# Patient Record
Sex: FEMALE | Race: OTHER | HISPANIC OR LATINO | URBAN - METROPOLITAN AREA
[De-identification: names, ages, dates, MRNs, and addresses within clinical notes are randomized per-mention and may not be internally consistent; named-entity substitution may affect disease eponyms.]

---

## 2018-08-08 ENCOUNTER — EMERGENCY (EMERGENCY)
Facility: HOSPITAL | Age: 33
LOS: 1 days | Discharge: HOME | End: 2018-08-08
Attending: EMERGENCY MEDICINE
Payer: SELF-PAY

## 2018-08-08 VITALS
SYSTOLIC BLOOD PRESSURE: 109 MMHG | OXYGEN SATURATION: 99 % | RESPIRATION RATE: 18 BRPM | WEIGHT: 147.05 LBS | TEMPERATURE: 98 F | DIASTOLIC BLOOD PRESSURE: 72 MMHG | HEART RATE: 70 BPM | HEIGHT: 63 IN

## 2018-08-08 PROCEDURE — T1013: CPT

## 2018-08-08 PROCEDURE — 99283 EMERGENCY DEPT VISIT LOW MDM: CPT

## 2018-08-08 PROCEDURE — 99053 MED SERV 10PM-8AM 24 HR FAC: CPT

## 2018-08-08 PROCEDURE — 99283 EMERGENCY DEPT VISIT LOW MDM: CPT | Mod: 25

## 2018-08-08 RX ORDER — HYDROXYZINE HCL 10 MG
1 TABLET ORAL
Qty: 15 | Refills: 0 | OUTPATIENT
Start: 2018-08-08

## 2018-08-08 NOTE — ED STATDOCS - CARE PLAN
Principal Discharge DX:	Urticaria  Assessment and plan of treatment:	take prednisone as prescribed.  use hydroxyzine every 6 hours as needed for itch.  Follow-up with your PMD in 2-3 days for re-assessment and referral to allergist, if necessary.

## 2018-08-08 NOTE — ED STATDOCS - OBJECTIVE STATEMENT
itchy rash since last night: startred on arms and subsequwently spread to legs and torso.  no diff breathing, no diff swallowing.  H/O allergy to shrimp shell.  Denies new lotions/ creams/ perfumes, ointments, soaps, detergents.  Ate peanut butter yesterday but no prior peanut reaction. PMD: Giana

## 2018-08-08 NOTE — ED STATDOCS - PLAN OF CARE
take prednisone as prescribed.  use hydroxyzine every 6 hours as needed for itch.  Follow-up with your PMD in 2-3 days for re-assessment and referral to allergist, if necessary.

## 2018-08-22 ENCOUNTER — EMERGENCY (EMERGENCY)
Facility: HOSPITAL | Age: 33
LOS: 1 days | Discharge: HOME | End: 2018-08-22
Attending: EMERGENCY MEDICINE
Payer: SELF-PAY

## 2018-08-22 VITALS
RESPIRATION RATE: 16 BRPM | TEMPERATURE: 99 F | HEART RATE: 61 BPM | OXYGEN SATURATION: 100 % | DIASTOLIC BLOOD PRESSURE: 66 MMHG | SYSTOLIC BLOOD PRESSURE: 99 MMHG

## 2018-08-22 VITALS — HEIGHT: 63 IN | WEIGHT: 147.05 LBS

## 2018-08-22 PROCEDURE — 99283 EMERGENCY DEPT VISIT LOW MDM: CPT

## 2018-08-22 RX ORDER — CYCLOBENZAPRINE HYDROCHLORIDE 10 MG/1
1 TABLET, FILM COATED ORAL
Qty: 90 | Refills: 0 | OUTPATIENT
Start: 2018-08-22 | End: 2018-09-20

## 2018-08-22 RX ORDER — IBUPROFEN 200 MG
1 TABLET ORAL
Qty: 120 | Refills: 0 | OUTPATIENT
Start: 2018-08-22 | End: 2018-09-20

## 2018-08-22 NOTE — ED STATDOCS - OBJECTIVE STATEMENT
34 y/o F pt presents to the ED c/o headache for the past 2 weeks.  She notes the HA to be throbbing on the L-side, and note intermittent L arm tingling.  She has felt occasional nausea and intermittent L-sided neck pain.  When her HA is really bad, she notes some eye pain.  Currently in the ED her HA pain is rated 7/10.  Pt did not take Motrin today, and notes that when she does take Motrin, it does not help.  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  Denies congestions, facial pain, cough, vomiting.  No further acute complaints at this time.

## 2018-08-22 NOTE — ED STATDOCS - MEDICAL DECISION MAKING DETAILS
34 y/o F with possible tension headache. Will give muscle relaxers and have her follow up in [facility]. 32 y/o F with possible tension headache. Will give muscle relaxers and have her follow up in [facility].

## 2018-08-22 NOTE — ED STATDOCS - MUSCULOSKELETAL, MLM
range of motion is not limited and there is no muscle tenderness. tenderness to L paraspinal cervical muscles.

## 2020-02-25 ENCOUNTER — APPOINTMENT (OUTPATIENT)
Dept: DERMATOLOGY | Facility: CLINIC | Age: 35
End: 2020-02-25

## 2020-06-11 ENCOUNTER — APPOINTMENT (OUTPATIENT)
Dept: DERMATOLOGY | Facility: CLINIC | Age: 35
End: 2020-06-11

## 2020-07-09 ENCOUNTER — APPOINTMENT (OUTPATIENT)
Dept: DERMATOLOGY | Facility: CLINIC | Age: 35
End: 2020-07-09

## 2020-08-28 PROBLEM — Z00.00 ENCOUNTER FOR PREVENTIVE HEALTH EXAMINATION: Status: ACTIVE | Noted: 2020-08-28

## 2020-12-08 ENCOUNTER — APPOINTMENT (OUTPATIENT)
Dept: RHEUMATOLOGY | Facility: CLINIC | Age: 35
End: 2020-12-08
Payer: MEDICAID

## 2020-12-08 VITALS
TEMPERATURE: 98.4 F | SYSTOLIC BLOOD PRESSURE: 102 MMHG | OXYGEN SATURATION: 98 % | WEIGHT: 150 LBS | RESPIRATION RATE: 17 BRPM | HEIGHT: 63 IN | DIASTOLIC BLOOD PRESSURE: 64 MMHG | HEART RATE: 60 BPM | BODY MASS INDEX: 26.58 KG/M2

## 2020-12-08 DIAGNOSIS — Z78.9 OTHER SPECIFIED HEALTH STATUS: ICD-10-CM

## 2020-12-08 PROCEDURE — 99203 OFFICE O/P NEW LOW 30 MIN: CPT

## 2020-12-08 PROCEDURE — 99072 ADDL SUPL MATRL&STAF TM PHE: CPT

## 2020-12-08 RX ORDER — UBIDECARENONE/VIT E ACET 100MG-5
CAPSULE ORAL
Refills: 0 | Status: ACTIVE | COMMUNITY

## 2020-12-08 NOTE — PHYSICAL EXAM
[General Appearance - Alert] : alert [General Appearance - In No Acute Distress] : in no acute distress [General Appearance - Well Nourished] : well nourished [General Appearance - Well Developed] : well developed [Sclera] : the sclera and conjunctiva were normal [PERRL With Normal Accommodation] : pupils were equal in size, round, and reactive to light [Extraocular Movements] : extraocular movements were intact [Outer Ear] : the ears and nose were normal in appearance [Examination Of The Oral Cavity] : the lips and gums were normal [Oropharynx] : the oropharynx was normal [Neck Appearance] : the appearance of the neck was normal [Jugular Venous Distention Increased] : there was no jugular-venous distention [Auscultation Breath Sounds / Voice Sounds] : lungs were clear to auscultation bilaterally [Heart Rate And Rhythm] : heart rate was normal and rhythm regular [Heart Sounds] : normal S1 and S2 [Heart Sounds Gallop] : no gallops [Murmurs] : no murmurs [Heart Sounds Pericardial Friction Rub] : no pericardial rub [Bowel Sounds] : normal bowel sounds [Abdomen Soft] : soft [Abdomen Tenderness] : non-tender [No CVA Tenderness] : no ~M costovertebral angle tenderness [No Spinal Tenderness] : no spinal tenderness [Abnormal Walk] : normal gait [Nail Clubbing] : no clubbing  or cyanosis of the fingernails [Involuntary Movements] : no involuntary movements were seen [Musculoskeletal - Swelling] : no joint swelling seen [Motor Tone] : muscle strength and tone were normal [] : no rash [Skin Lesions] : no skin lesions [Sensation] : the sensory exam was normal to light touch and pinprick [Motor Exam] : the motor exam was normal [No Focal Deficits] : no focal deficits [Oriented To Time, Place, And Person] : oriented to person, place, and time [Impaired Insight] : insight and judgment were intact [Affect] : the affect was normal [Mood] : the mood was normal [FreeTextEntry1] : Cervical paraspinal tenderness

## 2020-12-08 NOTE — HISTORY OF PRESENT ILLNESS
[FreeTextEntry1] : 35 year old female with PMH as listed below presents today for an initial evaluation\par \par Reports to have 3 month hx of fatigue, myalgias to neck. Dx with Lyme disease 09/2020. Lyme IgM Interpretation +23, 39 band. s/p treatment with ABX for 2 weeks with some improvement in symptoms. \par \par Today reports to have continued, constant soreness/ myalgias to neck. Taking Motrin prn with some improvement in symptoms. Symptoms worse in AM. Denies radicular symptoms.

## 2020-12-08 NOTE — ASSESSMENT
[FreeTextEntry1] : 35 year old presents today for an initial evaluation. Has hx of fatigue, myalgias to neck. Dx with Lyme disease 09/2020. Lyme IgM Interpretation +23, 39 band. s/p treatment with ABX for 2 weeks with some improvement in symptoms. \par \par Myalgias to neck\par Fatigue\par \par -encouraged proper diet, good sleep hygiene, exercise \par -trail with cyclobenzaprine 5mg daily prn at bedtime \par -NSAIDS/Tylenol prn for pain (reviewed risk and benefits of medications)\par \par Discussed treatment plan with the patient. The patient was given the opportunity to ask questions and all questions were answered to their satisfaction.

## 2021-03-22 ENCOUNTER — APPOINTMENT (OUTPATIENT)
Dept: RHEUMATOLOGY | Facility: CLINIC | Age: 36
End: 2021-03-22
Payer: MEDICAID

## 2021-03-22 VITALS
RESPIRATION RATE: 17 BRPM | BODY MASS INDEX: 26.58 KG/M2 | HEART RATE: 83 BPM | SYSTOLIC BLOOD PRESSURE: 104 MMHG | TEMPERATURE: 97.6 F | HEIGHT: 63 IN | WEIGHT: 150 LBS | OXYGEN SATURATION: 98 % | DIASTOLIC BLOOD PRESSURE: 68 MMHG

## 2021-03-22 PROCEDURE — 99214 OFFICE O/P EST MOD 30 MIN: CPT

## 2021-03-22 PROCEDURE — 99072 ADDL SUPL MATRL&STAF TM PHE: CPT

## 2021-03-22 NOTE — HISTORY OF PRESENT ILLNESS
[FreeTextEntry1] : Pt presenting today for a f.u visit. Reports feeling well overall. Taking cyclobenzaprine 5mg daily prn at bedtime with improvement in symptoms. \par No current complaints. \par Requesting to repeat labs. \par Denies fever, chills, CP, SOB, abd pain, rashes.

## 2021-03-22 NOTE — ASSESSMENT
[FreeTextEntry1] : 35 year old presents today for an f/u visit. Has hx of fatigue, myalgias to neck. Dx with Lyme disease 09/2020. Lyme IgM Interpretation +23, 39 band. s/p treatment with ABX for 2 weeks with improvement in symptoms. \par \par Myalgias to neck\par Fatigue\par \par -pt requesting to repeat all labs\par -encouraged proper diet, good sleep hygiene, exercise \par -c/w cyclobenzaprine 5mg daily prn at bedtime \par -NSAIDS/Tylenol prn for pain\par \par Discussed treatment plan with the patient. The patient was given the opportunity to ask questions and all questions were answered to their satisfaction.

## 2021-03-28 LAB
25(OH)D3 SERPL-MCNC: 26.8 NG/ML
ALBUMIN SERPL ELPH-MCNC: 4.7 G/DL
ALP BLD-CCNC: 82 U/L
ALT SERPL-CCNC: 10 U/L
ANION GAP SERPL CALC-SCNC: 12 MMOL/L
APPEARANCE: CLEAR
AST SERPL-CCNC: 15 U/L
BASOPHILS # BLD AUTO: 0.04 K/UL
BASOPHILS NFR BLD AUTO: 0.8 %
BILIRUB SERPL-MCNC: 0.2 MG/DL
BILIRUBIN URINE: NEGATIVE
BLOOD URINE: ABNORMAL
BUN SERPL-MCNC: 14 MG/DL
CALCIUM SERPL-MCNC: 9.3 MG/DL
CHLORIDE SERPL-SCNC: 104 MMOL/L
CO2 SERPL-SCNC: 24 MMOL/L
COLOR: NORMAL
CREAT SERPL-MCNC: 0.62 MG/DL
CREAT SPEC-SCNC: 99 MG/DL
CREAT/PROT UR: 0.1 RATIO
CRP SERPL-MCNC: <3 MG/L
EOSINOPHIL # BLD AUTO: 0.02 K/UL
EOSINOPHIL NFR BLD AUTO: 0.4 %
ERYTHROCYTE [SEDIMENTATION RATE] IN BLOOD BY WESTERGREN METHOD: 23 MM/HR
GLUCOSE QUALITATIVE U: NEGATIVE
GLUCOSE SERPL-MCNC: 110 MG/DL
HCT VFR BLD CALC: 42.4 %
HGB BLD-MCNC: 13.2 G/DL
IMM GRANULOCYTES NFR BLD AUTO: 0.4 %
KETONES URINE: NEGATIVE
LEUKOCYTE ESTERASE URINE: NEGATIVE
LYMPHOCYTES # BLD AUTO: 1.18 K/UL
LYMPHOCYTES NFR BLD AUTO: 22.3 %
MAN DIFF?: NORMAL
MCHC RBC-ENTMCNC: 29.1 PG
MCHC RBC-ENTMCNC: 31.1 GM/DL
MCV RBC AUTO: 93.4 FL
MONOCYTES # BLD AUTO: 0.44 K/UL
MONOCYTES NFR BLD AUTO: 8.3 %
NEUTROPHILS # BLD AUTO: 3.58 K/UL
NEUTROPHILS NFR BLD AUTO: 67.8 %
NITRITE URINE: NEGATIVE
PH URINE: 6.5
PLATELET # BLD AUTO: 138 K/UL
POTASSIUM SERPL-SCNC: 3.5 MMOL/L
PROT SERPL-MCNC: 7.4 G/DL
PROT UR-MCNC: 6 MG/DL
PROTEIN URINE: NEGATIVE
RBC # BLD: 4.54 M/UL
RBC # FLD: 13.4 %
RHEUMATOID FACT SER QL: 13 IU/ML
SODIUM SERPL-SCNC: 141 MMOL/L
SPECIFIC GRAVITY URINE: 1.02
TSH SERPL-ACNC: 1.13 UIU/ML
UROBILINOGEN URINE: NORMAL
WBC # FLD AUTO: 5.28 K/UL

## 2021-04-14 LAB
A PHAGOCYTOPH IGG TITR SER IF: NORMAL TITER
ANA SER IF-ACNC: NEGATIVE
B BURGDOR AB SER QL IA: NEGATIVE
B BURGDOR AB SER-IMP: NEGATIVE
B BURGDOR IGM PATRN SER IB-IMP: NEGATIVE
B BURGDOR18KD IGG SER QL IB: NORMAL
B BURGDOR23KD IGG SER QL IB: NORMAL
B BURGDOR23KD IGM SER QL IB: NORMAL
B BURGDOR28KD IGG SER QL IB: NORMAL
B BURGDOR30KD IGG SER QL IB: NORMAL
B BURGDOR31KD IGG SER QL IB: NORMAL
B BURGDOR39KD IGG SER QL IB: NORMAL
B BURGDOR39KD IGM SER QL IB: NORMAL
B BURGDOR41KD IGG SER QL IB: PRESENT
B BURGDOR41KD IGM SER QL IB: PRESENT
B BURGDOR45KD IGG SER QL IB: NORMAL
B BURGDOR58KD IGG SER QL IB: NORMAL
B BURGDOR66KD IGG SER QL IB: NORMAL
B BURGDOR93KD IGG SER QL IB: NORMAL
B MICROTI IGG TITR SER: NORMAL TITER
C3 SERPL-MCNC: 157 MG/DL
C4 SERPL-MCNC: 27 MG/DL
CCP AB SER IA-ACNC: <8 UNITS
CENTROMERE IGG SER-ACNC: <0.2 CD:130001892
CHROMATIN AB SERPL-ACNC: <0.2 AL
DSDNA AB SER-ACNC: <12 IU/ML
E CHAFFEENSIS IGG TITR SER IF: NORMAL TITER
ENA RNP AB SER IA-ACNC: <0.2 AL
ENA RNP AB SER IA-ACNC: <0.2 AL
ENA SM AB SER IA-ACNC: <0.2 AL
ENA SS-A AB SER IA-ACNC: <0.2 AL
ENA SS-B AB SER IA-ACNC: <0.2 AL
RF+CCP IGG SER-IMP: NEGATIVE
THYROGLOB AB SERPL-ACNC: <20 IU/ML
THYROPEROXIDASE AB SERPL IA-ACNC: <10 IU/ML

## 2021-08-05 ENCOUNTER — APPOINTMENT (OUTPATIENT)
Dept: ORTHOPEDIC SURGERY | Facility: CLINIC | Age: 36
End: 2021-08-05
Payer: MEDICAID

## 2021-08-05 VITALS
BODY MASS INDEX: 27.64 KG/M2 | WEIGHT: 156 LBS | HEIGHT: 63 IN | DIASTOLIC BLOOD PRESSURE: 64 MMHG | HEART RATE: 66 BPM | SYSTOLIC BLOOD PRESSURE: 102 MMHG

## 2021-08-05 DIAGNOSIS — M25.561 PAIN IN RIGHT KNEE: ICD-10-CM

## 2021-08-05 DIAGNOSIS — Z78.9 OTHER SPECIFIED HEALTH STATUS: ICD-10-CM

## 2021-08-05 DIAGNOSIS — M25.562 PAIN IN LEFT KNEE: ICD-10-CM

## 2021-08-05 PROCEDURE — 73562 X-RAY EXAM OF KNEE 3: CPT | Mod: LT

## 2021-08-05 PROCEDURE — 99203 OFFICE O/P NEW LOW 30 MIN: CPT

## 2021-08-05 RX ORDER — CYCLOBENZAPRINE HYDROCHLORIDE 5 MG/1
5 TABLET, FILM COATED ORAL
Qty: 30 | Refills: 1 | Status: DISCONTINUED | COMMUNITY
Start: 2020-12-08 | End: 2021-08-05

## 2021-08-20 ENCOUNTER — APPOINTMENT (OUTPATIENT)
Dept: RHEUMATOLOGY | Facility: CLINIC | Age: 36
End: 2021-08-20
Payer: MEDICAID

## 2021-08-20 VITALS
BODY MASS INDEX: 27.64 KG/M2 | HEART RATE: 62 BPM | RESPIRATION RATE: 17 BRPM | HEIGHT: 63 IN | WEIGHT: 156 LBS | OXYGEN SATURATION: 99 % | DIASTOLIC BLOOD PRESSURE: 64 MMHG | SYSTOLIC BLOOD PRESSURE: 110 MMHG | TEMPERATURE: 98 F

## 2021-08-20 DIAGNOSIS — M79.10 MYALGIA, UNSPECIFIED SITE: ICD-10-CM

## 2021-08-20 DIAGNOSIS — R53.82 CHRONIC FATIGUE, UNSPECIFIED: ICD-10-CM

## 2021-08-20 PROCEDURE — 99213 OFFICE O/P EST LOW 20 MIN: CPT

## 2021-08-20 RX ORDER — TIZANIDINE 2 MG/1
2 TABLET ORAL
Qty: 60 | Refills: 3 | Status: ACTIVE | COMMUNITY
Start: 2021-08-20 | End: 1900-01-01

## 2021-08-21 PROBLEM — R53.82 CHRONIC FATIGUE: Status: ACTIVE | Noted: 2020-12-08

## 2021-08-21 PROBLEM — M79.10 MYALGIA: Status: ACTIVE | Noted: 2020-12-08

## 2021-08-21 NOTE — HISTORY OF PRESENT ILLNESS
[FreeTextEntry1] : Pt presenting today for a f.u visit. Reports feeling well overall. Was taking cyclobenzaprine- but noted increased drowsiness and then ran out of meds. \par Requesting to repeat labs. \par Denies fever, chills, CP, SOB, abd pain, rashes. \par No current complaints

## 2021-08-21 NOTE — ASSESSMENT
[FreeTextEntry1] : Myalgias to neck- position, related to sleeping position\par Fatigue\par \par -pt requesting to repeat all labs\par -encouraged proper diet, good sleep hygiene, exercise \par -NSAIDS/Tylenol prn for pain\par -tizanidine 2 mg BID prn\par \par Discussed treatment plan with the patient. The patient was given the opportunity to ask questions and all questions were answered to their satisfaction.

## 2021-08-21 NOTE — PHYSICAL EXAM
[General Appearance - In No Acute Distress] : in no acute distress [General Appearance - Alert] : alert [General Appearance - Well Nourished] : well nourished [General Appearance - Well Developed] : well developed [Sclera] : the sclera and conjunctiva were normal [PERRL With Normal Accommodation] : pupils were equal in size, round, and reactive to light [Extraocular Movements] : extraocular movements were intact [Outer Ear] : the ears and nose were normal in appearance [Examination Of The Oral Cavity] : the lips and gums were normal [Oropharynx] : the oropharynx was normal [Neck Appearance] : the appearance of the neck was normal [Jugular Venous Distention Increased] : there was no jugular-venous distention [Auscultation Breath Sounds / Voice Sounds] : lungs were clear to auscultation bilaterally [Heart Rate And Rhythm] : heart rate was normal and rhythm regular [Heart Sounds] : normal S1 and S2 [Heart Sounds Gallop] : no gallops [Murmurs] : no murmurs [Heart Sounds Pericardial Friction Rub] : no pericardial rub [Bowel Sounds] : normal bowel sounds [Abdomen Soft] : soft [Abdomen Tenderness] : non-tender [No CVA Tenderness] : no ~M costovertebral angle tenderness [No Spinal Tenderness] : no spinal tenderness [Abnormal Walk] : normal gait [Nail Clubbing] : no clubbing  or cyanosis of the fingernails [Involuntary Movements] : no involuntary movements were seen [Motor Tone] : muscle strength and tone were normal [Musculoskeletal - Swelling] : no joint swelling seen [] : no rash [Sensation] : the sensory exam was normal to light touch and pinprick [Skin Lesions] : no skin lesions [Motor Exam] : the motor exam was normal [No Focal Deficits] : no focal deficits [Oriented To Time, Place, And Person] : oriented to person, place, and time [Affect] : the affect was normal [Impaired Insight] : insight and judgment were intact [Mood] : the mood was normal [FreeTextEntry1] : Cervical paraspinal tenderness

## 2021-09-15 LAB
25(OH)D3 SERPL-MCNC: 31.3 NG/ML
ALBUMIN SERPL ELPH-MCNC: 4.7 G/DL
ALP BLD-CCNC: 80 U/L
ALT SERPL-CCNC: 11 U/L
ANA SER IF-ACNC: NEGATIVE
ANION GAP SERPL CALC-SCNC: 13 MMOL/L
AST SERPL-CCNC: 12 U/L
BASOPHILS # BLD AUTO: 0.04 K/UL
BASOPHILS NFR BLD AUTO: 0.7 %
BILIRUB SERPL-MCNC: 0.2 MG/DL
BUN SERPL-MCNC: 14 MG/DL
CALCIUM SERPL-MCNC: 9.3 MG/DL
CCP AB SER IA-ACNC: <8 UNITS
CHLORIDE SERPL-SCNC: 103 MMOL/L
CK SERPL-CCNC: 73 U/L
CO2 SERPL-SCNC: 23 MMOL/L
CREAT SERPL-MCNC: 0.58 MG/DL
CRP SERPL-MCNC: <3 MG/L
EOSINOPHIL # BLD AUTO: 0.03 K/UL
EOSINOPHIL NFR BLD AUTO: 0.6 %
ERYTHROCYTE [SEDIMENTATION RATE] IN BLOOD BY WESTERGREN METHOD: 23 MM/HR
GLUCOSE SERPL-MCNC: 131 MG/DL
HCT VFR BLD CALC: 39.6 %
HGB BLD-MCNC: 12.8 G/DL
IMM GRANULOCYTES NFR BLD AUTO: 0.2 %
LYMPHOCYTES # BLD AUTO: 1.14 K/UL
LYMPHOCYTES NFR BLD AUTO: 21.1 %
MAN DIFF?: NORMAL
MCHC RBC-ENTMCNC: 28.9 PG
MCHC RBC-ENTMCNC: 32.3 GM/DL
MCV RBC AUTO: 89.4 FL
MONOCYTES # BLD AUTO: 0.37 K/UL
MONOCYTES NFR BLD AUTO: 6.8 %
NEUTROPHILS # BLD AUTO: 3.82 K/UL
NEUTROPHILS NFR BLD AUTO: 70.6 %
PLATELET # BLD AUTO: 110 K/UL
POTASSIUM SERPL-SCNC: 3.7 MMOL/L
PROT SERPL-MCNC: 6.8 G/DL
RBC # BLD: 4.43 M/UL
RBC # FLD: 13.2 %
RF+CCP IGG SER-IMP: NEGATIVE
RHEUMATOID FACT SER QL: 12 IU/ML
SODIUM SERPL-SCNC: 140 MMOL/L
TSH SERPL-ACNC: 0.89 UIU/ML
WBC # FLD AUTO: 5.41 K/UL

## 2021-11-26 ENCOUNTER — APPOINTMENT (OUTPATIENT)
Dept: RHEUMATOLOGY | Facility: CLINIC | Age: 36
End: 2021-11-26

## 2022-11-20 ENCOUNTER — TRANSCRIPTION ENCOUNTER (OUTPATIENT)
Age: 37
End: 2022-11-20

## 2022-11-20 ENCOUNTER — EMERGENCY (EMERGENCY)
Facility: HOSPITAL | Age: 37
LOS: 1 days | Discharge: HOME | End: 2022-11-20
Attending: STUDENT IN AN ORGANIZED HEALTH CARE EDUCATION/TRAINING PROGRAM
Payer: COMMERCIAL

## 2022-11-20 VITALS
RESPIRATION RATE: 18 BRPM | HEART RATE: 64 BPM | WEIGHT: 151.02 LBS | SYSTOLIC BLOOD PRESSURE: 114 MMHG | TEMPERATURE: 98 F | DIASTOLIC BLOOD PRESSURE: 61 MMHG | HEIGHT: 63 IN | OXYGEN SATURATION: 98 %

## 2022-11-20 LAB
ALBUMIN SERPL ELPH-MCNC: 4.5 G/DL — SIGNIFICANT CHANGE UP (ref 3.3–5.2)
ALP SERPL-CCNC: 75 U/L — SIGNIFICANT CHANGE UP (ref 40–120)
ALT FLD-CCNC: 10 U/L — SIGNIFICANT CHANGE UP
ANION GAP SERPL CALC-SCNC: 10 MMOL/L — SIGNIFICANT CHANGE UP (ref 5–17)
APPEARANCE UR: CLEAR — SIGNIFICANT CHANGE UP
AST SERPL-CCNC: 15 U/L — SIGNIFICANT CHANGE UP
BACTERIA # UR AUTO: ABNORMAL
BASOPHILS # BLD AUTO: 0.03 K/UL — SIGNIFICANT CHANGE UP (ref 0–0.2)
BASOPHILS NFR BLD AUTO: 0.5 % — SIGNIFICANT CHANGE UP (ref 0–2)
BILIRUB SERPL-MCNC: 0.4 MG/DL — SIGNIFICANT CHANGE UP (ref 0.4–2)
BILIRUB UR-MCNC: NEGATIVE — SIGNIFICANT CHANGE UP
BUN SERPL-MCNC: 14.3 MG/DL — SIGNIFICANT CHANGE UP (ref 8–20)
CALCIUM SERPL-MCNC: 9.2 MG/DL — SIGNIFICANT CHANGE UP (ref 8.4–10.5)
CHLORIDE SERPL-SCNC: 106 MMOL/L — SIGNIFICANT CHANGE UP (ref 96–108)
CO2 SERPL-SCNC: 23 MMOL/L — SIGNIFICANT CHANGE UP (ref 22–29)
COLOR SPEC: YELLOW — SIGNIFICANT CHANGE UP
CREAT SERPL-MCNC: 0.52 MG/DL — SIGNIFICANT CHANGE UP (ref 0.5–1.3)
DIFF PNL FLD: ABNORMAL
EGFR: 124 ML/MIN/1.73M2 — SIGNIFICANT CHANGE UP
EOSINOPHIL # BLD AUTO: 0.04 K/UL — SIGNIFICANT CHANGE UP (ref 0–0.5)
EOSINOPHIL NFR BLD AUTO: 0.7 % — SIGNIFICANT CHANGE UP (ref 0–6)
EPI CELLS # UR: SIGNIFICANT CHANGE UP
GLUCOSE SERPL-MCNC: 107 MG/DL — HIGH (ref 70–99)
GLUCOSE UR QL: NEGATIVE MG/DL — SIGNIFICANT CHANGE UP
HCG UR QL: NEGATIVE — SIGNIFICANT CHANGE UP
HCT VFR BLD CALC: 40.1 % — SIGNIFICANT CHANGE UP (ref 34.5–45)
HGB BLD-MCNC: 13.5 G/DL — SIGNIFICANT CHANGE UP (ref 11.5–15.5)
IMM GRANULOCYTES NFR BLD AUTO: 0.2 % — SIGNIFICANT CHANGE UP (ref 0–0.9)
KETONES UR-MCNC: NEGATIVE — SIGNIFICANT CHANGE UP
LEUKOCYTE ESTERASE UR-ACNC: NEGATIVE — SIGNIFICANT CHANGE UP
LYMPHOCYTES # BLD AUTO: 1.36 K/UL — SIGNIFICANT CHANGE UP (ref 1–3.3)
LYMPHOCYTES # BLD AUTO: 23.6 % — SIGNIFICANT CHANGE UP (ref 13–44)
MCHC RBC-ENTMCNC: 29.5 PG — SIGNIFICANT CHANGE UP (ref 27–34)
MCHC RBC-ENTMCNC: 33.7 GM/DL — SIGNIFICANT CHANGE UP (ref 32–36)
MCV RBC AUTO: 87.7 FL — SIGNIFICANT CHANGE UP (ref 80–100)
MONOCYTES # BLD AUTO: 0.41 K/UL — SIGNIFICANT CHANGE UP (ref 0–0.9)
MONOCYTES NFR BLD AUTO: 7.1 % — SIGNIFICANT CHANGE UP (ref 2–14)
NEUTROPHILS # BLD AUTO: 3.92 K/UL — SIGNIFICANT CHANGE UP (ref 1.8–7.4)
NEUTROPHILS NFR BLD AUTO: 67.9 % — SIGNIFICANT CHANGE UP (ref 43–77)
NITRITE UR-MCNC: NEGATIVE — SIGNIFICANT CHANGE UP
PH UR: 6 — SIGNIFICANT CHANGE UP (ref 5–8)
PLATELET # BLD AUTO: 117 K/UL — LOW (ref 150–400)
POTASSIUM SERPL-MCNC: 4.4 MMOL/L — SIGNIFICANT CHANGE UP (ref 3.5–5.3)
POTASSIUM SERPL-SCNC: 4.4 MMOL/L — SIGNIFICANT CHANGE UP (ref 3.5–5.3)
PROT SERPL-MCNC: 6.9 G/DL — SIGNIFICANT CHANGE UP (ref 6.6–8.7)
PROT UR-MCNC: 15 MG/DL
RBC # BLD: 4.57 M/UL — SIGNIFICANT CHANGE UP (ref 3.8–5.2)
RBC # FLD: 12.7 % — SIGNIFICANT CHANGE UP (ref 10.3–14.5)
RBC CASTS # UR COMP ASSIST: SIGNIFICANT CHANGE UP /HPF (ref 0–4)
SODIUM SERPL-SCNC: 139 MMOL/L — SIGNIFICANT CHANGE UP (ref 135–145)
SP GR SPEC: 1.02 — SIGNIFICANT CHANGE UP (ref 1.01–1.02)
UROBILINOGEN FLD QL: NEGATIVE MG/DL — SIGNIFICANT CHANGE UP
WBC # BLD: 5.77 K/UL — SIGNIFICANT CHANGE UP (ref 3.8–10.5)
WBC # FLD AUTO: 5.77 K/UL — SIGNIFICANT CHANGE UP (ref 3.8–10.5)
WBC UR QL: SIGNIFICANT CHANGE UP /HPF (ref 0–5)

## 2022-11-20 PROCEDURE — 81001 URINALYSIS AUTO W/SCOPE: CPT

## 2022-11-20 PROCEDURE — 36415 COLL VENOUS BLD VENIPUNCTURE: CPT

## 2022-11-20 PROCEDURE — 99284 EMERGENCY DEPT VISIT MOD MDM: CPT

## 2022-11-20 PROCEDURE — 85025 COMPLETE CBC W/AUTO DIFF WBC: CPT

## 2022-11-20 PROCEDURE — 76775 US EXAM ABDO BACK WALL LIM: CPT

## 2022-11-20 PROCEDURE — 87086 URINE CULTURE/COLONY COUNT: CPT

## 2022-11-20 PROCEDURE — 99285 EMERGENCY DEPT VISIT HI MDM: CPT

## 2022-11-20 PROCEDURE — 81025 URINE PREGNANCY TEST: CPT

## 2022-11-20 PROCEDURE — 76775 US EXAM ABDO BACK WALL LIM: CPT | Mod: 26

## 2022-11-20 PROCEDURE — 80053 COMPREHEN METABOLIC PANEL: CPT

## 2022-11-20 RX ORDER — METHOCARBAMOL 500 MG/1
1500 TABLET, FILM COATED ORAL ONCE
Refills: 0 | Status: COMPLETED | OUTPATIENT
Start: 2022-11-20 | End: 2022-11-20

## 2022-11-20 RX ORDER — LIDOCAINE 4 G/100G
1 CREAM TOPICAL ONCE
Refills: 0 | Status: COMPLETED | OUTPATIENT
Start: 2022-11-20 | End: 2022-11-20

## 2022-11-20 RX ADMIN — METHOCARBAMOL 1500 MILLIGRAM(S): 500 TABLET, FILM COATED ORAL at 09:04

## 2022-11-20 RX ADMIN — LIDOCAINE 1 PATCH: 4 CREAM TOPICAL at 09:05

## 2022-11-20 NOTE — ED STATDOCS - CLINICAL SUMMARY MEDICAL DECISION MAKING FREE TEXT BOX
36 y/o female with no pmhx, c/o L lower back pain radiating to abdomen and down L leg for a few months, which worsened for the past 3 days with N/V. evalu for hematuria or UTI. Pt denies bladder/bowel incontinence. saddle anesthesia, neuro deficits. No midline spine tenderness, no step offs. + Paraspinal ttp. Likely msk pain, analgesia, UA, reassess 38 y/o female with no pmhx, c/o L lower back pain radiating to abdomen and down L leg for a few months, which worsened for the past 3 days with N/V. eval for hematuria or UTI. Pt deniesIVDU, bladder/bowel incontinence. saddle anesthesia, neuro deficits. No midline spine tenderness, no step offs. + Paraspinal ttp. Likely msk pain, analgesia, UA, reassess

## 2022-11-20 NOTE — ED ADULT NURSE NOTE - OBJECTIVE STATEMENT
PT reports left side/rib pain and nausea x  few day aao x4, denies urinary frequency, hematuria,. Denies sob, cp.

## 2022-11-20 NOTE — ED STATDOCS - PHYSICAL EXAMINATION
Const: Awake, alert and oriented. In no acute distress. Well appearing.  HEENT: NC/AT. Moist mucous membranes.  Eyes: No scleral icterus. EOMI.  Neck:. Soft and supple. Full ROM without pain.  Cardiac: Regular rate and regular rhythm. +S1/S2. Peripheral pulses 2+ and symmetric. No LE edema.  Resp: Speaking in full sentences. No evidence of respiratory distress. No wheezes, rales or rhonchi.  Abd: Soft, non-tender, non-distended. Normal bowel sounds in all 4 quadrants. No guarding or rebound.  Back: Spine midline and +lower L paraspinal ttp. No CVAT.  Skin: No rashes, abrasions or lacerations.  Lymph: No cervical lymphadenopathy.  Neuro: Awake, alert & oriented x 3. Moves all extremities symmetrically.

## 2022-11-20 NOTE — ED STATDOCS - PATIENT PORTAL LINK FT
You can access the FollowMyHealth Patient Portal offered by [facility] by registering at the following website: [email or web address]. By joining [employer]’s FollowMyHealth portal, you will also be able to view your health information using other applications (apps) compatible with our system.

## 2022-11-20 NOTE — ED STATDOCS - OBJECTIVE STATEMENT
36 y/o female with no pmhx, c/o L flank pain radiating to abdomen and down L leg for a few months, which worsened for the past 3 days. Pt was seen by PMD with XR and US normal 2 months ago. Pt has been taking Motrin and Tylenol without relief. +with nausea and vomiting for 3 days. Denies dysuria or hematuria. Denies possible pregnancy. LMP 11/7. Social drinker. Denies smoking, illicit drug use or marijuana. No fever. Denies allergies. 38 y/o female with no pmhx, c/o L lower back pain radiating to abdomen and down L leg for a few months, which worsened for the past 3 days. Pt was seen by PMD with XR and US normal 2 months ago. Pt has been taking Motrin and Tylenol without relief. +with nausea and vomiting for 3 days. Denies dysuria or hematuria. Denies possible pregnancy. LMP 11/7. Social drinker. Denies smoking, illicit drug use or marijuana. No fever. Denies allergies.

## 2022-11-21 LAB
CULTURE RESULTS: SIGNIFICANT CHANGE UP
SPECIMEN SOURCE: SIGNIFICANT CHANGE UP

## 2023-04-20 ENCOUNTER — OFFICE (OUTPATIENT)
Dept: URBAN - METROPOLITAN AREA CLINIC 94 | Facility: CLINIC | Age: 38
Setting detail: OPHTHALMOLOGY
End: 2023-04-20
Payer: COMMERCIAL

## 2023-04-20 VITALS — HEIGHT: 55 IN

## 2023-04-20 DIAGNOSIS — H11.153: ICD-10-CM

## 2023-04-20 PROBLEM — H01.004 BLEPHARITIS; RIGHT UPPER LID, LEFT UPPER LID: Status: ACTIVE | Noted: 2023-04-20

## 2023-04-20 PROBLEM — H16.223 DRY EYE SYNDROME K SICCA; BOTH EYES: Status: ACTIVE | Noted: 2023-04-20

## 2023-04-20 PROBLEM — H01.001 BLEPHARITIS; RIGHT UPPER LID, LEFT UPPER LID: Status: ACTIVE | Noted: 2023-04-20

## 2023-04-20 PROCEDURE — 92014 COMPRE OPH EXAM EST PT 1/>: CPT | Performed by: PHYSICIAN ASSISTANT

## 2023-04-20 ASSESSMENT — REFRACTION_AUTOREFRACTION
OS_AXIS: 081
OS_SPHERE: -0.75
OD_CYLINDER: -0.25
OS_CYLINDER: -0.25
OD_AXIS: 175
OD_SPHERE: -1.00

## 2023-04-20 ASSESSMENT — SPHEQUIV_DERIVED
OS_SPHEQUIV: -0.875
OD_SPHEQUIV: -1.125
OD_SPHEQUIV: -1.25

## 2023-04-20 ASSESSMENT — KERATOMETRY
OS_K2POWER_DIOPTERS: 47.00
OS_AXISANGLE_DEGREES: 140
OS_K1POWER_DIOPTERS: 46.50
OD_K1POWER_DIOPTERS: 46.25
OD_AXISANGLE_DEGREES: 070
OD_K2POWER_DIOPTERS: 47.00

## 2023-04-20 ASSESSMENT — AXIALLENGTH_DERIVED
OD_AL: 22.9491
OD_AL: 22.9031
OS_AL: 22.7688

## 2023-04-20 ASSESSMENT — VISUAL ACUITY
OD_BCVA: 20/25-1
OS_BCVA: 20/25

## 2023-04-20 ASSESSMENT — REFRACTION_MANIFEST
OS_VA1: 20/20
OD_CYLINDER: -0.50
OS_SPHERE: -1.25
OD_VA1: 20/20
OD_AXIS: 170
OD_SPHERE: -1.00

## 2023-04-20 ASSESSMENT — LID EXAM ASSESSMENTS
OS_BLEPHARITIS: T
OD_BLEPHARITIS: T

## 2023-04-20 ASSESSMENT — SUPERFICIAL PUNCTATE KERATITIS (SPK)
OD_SPK: 1+
OS_SPK: 1+

## 2023-04-20 ASSESSMENT — TONOMETRY
OD_IOP_MMHG: 15
OS_IOP_MMHG: 11

## 2023-04-20 ASSESSMENT — CONFRONTATIONAL VISUAL FIELD TEST (CVF)
OS_FINDINGS: FULL
OD_FINDINGS: FULL

## 2023-05-30 ENCOUNTER — OFFICE (OUTPATIENT)
Dept: URBAN - METROPOLITAN AREA CLINIC 94 | Facility: CLINIC | Age: 38
Setting detail: OPHTHALMOLOGY
End: 2023-05-30
Payer: COMMERCIAL

## 2023-05-30 DIAGNOSIS — H01.001: ICD-10-CM

## 2023-05-30 DIAGNOSIS — H01.004: ICD-10-CM

## 2023-05-30 PROCEDURE — 99213 OFFICE O/P EST LOW 20 MIN: CPT | Performed by: OPHTHALMOLOGY

## 2023-05-30 ASSESSMENT — KERATOMETRY
OS_AXISANGLE_DEGREES: 140
OD_AXISANGLE_DEGREES: 070
OS_K1POWER_DIOPTERS: 46.50
OD_K1POWER_DIOPTERS: 46.25
OD_K2POWER_DIOPTERS: 47.00
OS_K2POWER_DIOPTERS: 47.00

## 2023-05-30 ASSESSMENT — SPHEQUIV_DERIVED
OD_SPHEQUIV: -1.25
OS_SPHEQUIV: -0.875
OD_SPHEQUIV: -1.125

## 2023-05-30 ASSESSMENT — REFRACTION_MANIFEST
OD_SPHERE: -1.00
OS_SPHERE: -1.25
OS_VA1: 20/20
OD_AXIS: 170
OD_VA1: 20/20
OD_CYLINDER: -0.50

## 2023-05-30 ASSESSMENT — TONOMETRY
OD_IOP_MMHG: 12
OS_IOP_MMHG: 11

## 2023-05-30 ASSESSMENT — AXIALLENGTH_DERIVED
OS_AL: 22.7688
OD_AL: 22.9491
OD_AL: 22.9031

## 2023-05-30 ASSESSMENT — SUPERFICIAL PUNCTATE KERATITIS (SPK)
OS_SPK: 1+
OD_SPK: 1+

## 2023-05-30 ASSESSMENT — REFRACTION_AUTOREFRACTION
OS_CYLINDER: -0.25
OD_CYLINDER: -0.25
OS_SPHERE: -0.75
OD_AXIS: 175
OS_AXIS: 081
OD_SPHERE: -1.00

## 2023-05-30 ASSESSMENT — CONFRONTATIONAL VISUAL FIELD TEST (CVF)
OS_FINDINGS: FULL
OD_FINDINGS: FULL

## 2023-05-30 ASSESSMENT — LID EXAM ASSESSMENTS
OS_BLEPHARITIS: T
OD_BLEPHARITIS: T

## 2023-05-30 ASSESSMENT — VISUAL ACUITY
OD_BCVA: 20/20
OS_BCVA: 20/20

## 2023-07-28 ENCOUNTER — OFFICE (OUTPATIENT)
Dept: URBAN - METROPOLITAN AREA CLINIC 94 | Facility: CLINIC | Age: 38
Setting detail: OPHTHALMOLOGY
End: 2023-07-28
Payer: COMMERCIAL

## 2023-07-28 DIAGNOSIS — H16.222: ICD-10-CM

## 2023-07-28 DIAGNOSIS — H16.223: ICD-10-CM

## 2023-07-28 DIAGNOSIS — H16.221: ICD-10-CM

## 2023-07-28 PROCEDURE — 92012 INTRM OPH EXAM EST PATIENT: CPT | Performed by: OPHTHALMOLOGY

## 2023-07-28 PROCEDURE — 83861 MICROFLUID ANALY TEARS: CPT | Performed by: OPHTHALMOLOGY

## 2023-07-28 ASSESSMENT — LID EXAM ASSESSMENTS
OD_BLEPHARITIS: T
OS_BLEPHARITIS: T

## 2023-07-28 ASSESSMENT — KERATOMETRY
OS_K1POWER_DIOPTERS: 46.50
OS_AXISANGLE_DEGREES: 140
OD_AXISANGLE_DEGREES: 070
OD_K1POWER_DIOPTERS: 46.25
OS_K2POWER_DIOPTERS: 47.00
OD_K2POWER_DIOPTERS: 47.00

## 2023-07-28 ASSESSMENT — VISUAL ACUITY
OS_BCVA: 20/20
OD_BCVA: 20/20

## 2023-07-28 ASSESSMENT — REFRACTION_AUTOREFRACTION
OD_AXIS: 175
OS_AXIS: 081
OS_SPHERE: -0.75
OD_CYLINDER: -0.25
OD_SPHERE: -1.00
OS_CYLINDER: -0.25

## 2023-07-28 ASSESSMENT — TONOMETRY
OD_IOP_MMHG: 11
OS_IOP_MMHG: 12

## 2023-07-28 ASSESSMENT — SUPERFICIAL PUNCTATE KERATITIS (SPK)
OD_SPK: 1+
OS_SPK: 1+

## 2023-07-28 ASSESSMENT — REFRACTION_MANIFEST
OD_SPHERE: -1.00
OS_SPHERE: -1.25
OD_VA1: 20/20
OD_CYLINDER: -0.50
OD_AXIS: 170
OS_VA1: 20/20

## 2023-07-28 ASSESSMENT — AXIALLENGTH_DERIVED
OD_AL: 22.9491
OS_AL: 22.7688
OD_AL: 22.9031

## 2023-07-28 ASSESSMENT — CONFRONTATIONAL VISUAL FIELD TEST (CVF)
OS_FINDINGS: FULL
OD_FINDINGS: FULL

## 2023-07-28 ASSESSMENT — SPHEQUIV_DERIVED
OD_SPHEQUIV: -1.125
OS_SPHEQUIV: -0.875
OD_SPHEQUIV: -1.25

## 2024-01-19 NOTE — ED ADULT TRIAGE NOTE - HEIGHT IN INCHES
[facility]- Outpatient Rehabilitation and Therapy [address], [place] [address] office: [phone] fax: [phone]      Physical Therapy: TREATMENT/PROGRESS NOTE   Patient: Miguel Ángel Patel (60 y.o. male)   Treatment Date: 2024   :  1964 MRN: [id number]   Visit #: 4 of 10  Insurance Allowable Auth Needed   20 VPCY  10 approved -  No vaso [x]Yes    []No    Insurance: Payor: BC / Plan: [facility] - [place] PPO / Product Type: *No Product type* /   Insurance ID: [id number] - ([facility])  Secondary Insurance (if applicable):    Treatment Diagnosis:     ICD-10-CM    1. Chronic pain of right knee  M25.561     G89.29       2. Effusion of bursa of right knee  M25.461       3. Atrophy of quadriceps femoris muscle  M62.559          Medical Diagnosis:    Unilateral primary osteoarthritis, right knee [M17.11]  S/P Right TKA on 23   Referring Physician: Loy Arguelles MD  PCP: Edmar Herbert                             Plan of care signed (Y/N): N    Date of Patient follow up with Physician: 24     Progress Report/POC: No  POC update due: 2024 (OR 10 visits /OR AUTH LIMITS, whichever is less)    OUTCOME MEASURE DATE % Deficit   WOMAC 24 31%            Latex Allergy:  [x]NO      []YES  Preferred Language for Healthcare:   [x]English       []other:        SUBJECTIVE EXAMINATION     Pain: -2/10    Patient Report/Comments: 4+ weeks PO. Feeling fine. No new issues. Still has quad pain/tightness with KF ROM.         OBJECTIVE EXAMINATION     24 deferred  Flexibility L R Comment   Hamstring     90/90   Gastroc         ITB     Benito's   Quad     Ely's   Hip Flexor     Russel      24  ROM PROM AROM Comment     L R L R     Knee Flexion   105 ERMI 130       Knee Extension   -2 +2 0 QS                   Hip Flexion             Hip Extension             Hip IR             Hip ER                24  Strength L R Comment   Quad         Hamstring         Hip  
3

## 2024-02-09 ENCOUNTER — OFFICE (OUTPATIENT)
Dept: URBAN - METROPOLITAN AREA CLINIC 94 | Facility: CLINIC | Age: 39
Setting detail: OPHTHALMOLOGY
End: 2024-02-09
Payer: COMMERCIAL

## 2024-02-09 ENCOUNTER — RX ONLY (RX ONLY)
Age: 39
End: 2024-02-09

## 2024-02-09 DIAGNOSIS — H11.151: ICD-10-CM

## 2024-02-09 DIAGNOSIS — H11.042: ICD-10-CM

## 2024-02-09 DIAGNOSIS — H16.221: ICD-10-CM

## 2024-02-09 DIAGNOSIS — H01.001: ICD-10-CM

## 2024-02-09 DIAGNOSIS — H16.222: ICD-10-CM

## 2024-02-09 DIAGNOSIS — H01.004: ICD-10-CM

## 2024-02-09 PROCEDURE — 92285 EXTERNAL OCULAR PHOTOGRAPHY: CPT | Performed by: OPHTHALMOLOGY

## 2024-02-09 PROCEDURE — 83861 MICROFLUID ANALY TEARS: CPT | Mod: RT | Performed by: OPHTHALMOLOGY

## 2024-02-09 PROCEDURE — 99213 OFFICE O/P EST LOW 20 MIN: CPT | Performed by: OPHTHALMOLOGY

## 2024-02-09 PROCEDURE — 83861 MICROFLUID ANALY TEARS: CPT | Mod: LT | Performed by: OPHTHALMOLOGY

## 2024-02-09 ASSESSMENT — REFRACTION_AUTOREFRACTION
OD_SPHERE: -1.00
OD_CYLINDER: -0.25
OD_AXIS: 175
OS_SPHERE: -0.75
OS_AXIS: 081
OS_CYLINDER: -0.25

## 2024-02-09 ASSESSMENT — CONFRONTATIONAL VISUAL FIELD TEST (CVF)
OS_FINDINGS: FULL
OD_FINDINGS: FULL

## 2024-02-09 ASSESSMENT — REFRACTION_MANIFEST
OS_SPHERE: -1.25
OD_VA1: 20/20
OS_VA1: 20/20
OD_CYLINDER: -0.50
OD_AXIS: 170
OD_SPHERE: -1.00

## 2024-02-09 ASSESSMENT — SPHEQUIV_DERIVED
OS_SPHEQUIV: -0.875
OD_SPHEQUIV: -1.125
OD_SPHEQUIV: -1.25

## 2024-02-09 ASSESSMENT — LID EXAM ASSESSMENTS
OD_BLEPHARITIS: T
OS_BLEPHARITIS: T

## 2024-02-09 ASSESSMENT — SUPERFICIAL PUNCTATE KERATITIS (SPK)
OD_SPK: 1+
OS_SPK: 1+

## 2024-02-09 ASSESSMENT — CORNEAL PTERYGIUM: OS_PTERYGIUM: NASAL 1MM

## 2024-02-20 ENCOUNTER — EMERGENCY (EMERGENCY)
Facility: HOSPITAL | Age: 39
LOS: 1 days | Discharge: HOME | End: 2024-02-20
Attending: EMERGENCY MEDICINE
Payer: COMMERCIAL

## 2024-02-20 VITALS
DIASTOLIC BLOOD PRESSURE: 68 MMHG | WEIGHT: 158.29 LBS | RESPIRATION RATE: 18 BRPM | OXYGEN SATURATION: 98 % | TEMPERATURE: 98 F | SYSTOLIC BLOOD PRESSURE: 108 MMHG | HEART RATE: 74 BPM | HEIGHT: 63 IN

## 2024-02-20 LAB
APPEARANCE UR: CLEAR — SIGNIFICANT CHANGE UP
BILIRUB UR-MCNC: NEGATIVE — SIGNIFICANT CHANGE UP
COLOR SPEC: YELLOW — SIGNIFICANT CHANGE UP
DIFF PNL FLD: NEGATIVE — SIGNIFICANT CHANGE UP
GLUCOSE UR QL: NEGATIVE MG/DL — SIGNIFICANT CHANGE UP
HCG UR QL: NEGATIVE — SIGNIFICANT CHANGE UP
KETONES UR-MCNC: NEGATIVE MG/DL — SIGNIFICANT CHANGE UP
LEUKOCYTE ESTERASE UR-ACNC: NEGATIVE — SIGNIFICANT CHANGE UP
NITRITE UR-MCNC: NEGATIVE — SIGNIFICANT CHANGE UP
PH UR: 6 — SIGNIFICANT CHANGE UP (ref 5–8)
PROT UR-MCNC: NEGATIVE MG/DL — SIGNIFICANT CHANGE UP
SP GR SPEC: 1.01 — SIGNIFICANT CHANGE UP (ref 1–1.03)
UROBILINOGEN FLD QL: 0.2 MG/DL — SIGNIFICANT CHANGE UP (ref 0.2–1)

## 2024-02-20 PROCEDURE — 96372 THER/PROPH/DIAG INJ SC/IM: CPT

## 2024-02-20 PROCEDURE — 81003 URINALYSIS AUTO W/O SCOPE: CPT

## 2024-02-20 PROCEDURE — T1013: CPT

## 2024-02-20 PROCEDURE — 99283 EMERGENCY DEPT VISIT LOW MDM: CPT

## 2024-02-20 PROCEDURE — 99284 EMERGENCY DEPT VISIT MOD MDM: CPT

## 2024-02-20 PROCEDURE — 81025 URINE PREGNANCY TEST: CPT

## 2024-02-20 RX ORDER — METOCLOPRAMIDE HCL 10 MG
10 TABLET ORAL ONCE
Refills: 0 | Status: COMPLETED | OUTPATIENT
Start: 2024-02-20 | End: 2024-02-20

## 2024-02-20 RX ORDER — KETOROLAC TROMETHAMINE 30 MG/ML
30 SYRINGE (ML) INJECTION ONCE
Refills: 0 | Status: DISCONTINUED | OUTPATIENT
Start: 2024-02-20 | End: 2024-02-20

## 2024-02-20 RX ORDER — SUMATRIPTAN SUCCINATE 4 MG/.5ML
1 INJECTION, SOLUTION SUBCUTANEOUS
Qty: 14 | Refills: 0
Start: 2024-02-20 | End: 2024-03-04

## 2024-02-20 RX ORDER — SUMATRIPTAN SUCCINATE 4 MG/.5ML
50 INJECTION, SOLUTION SUBCUTANEOUS ONCE
Refills: 0 | Status: COMPLETED | OUTPATIENT
Start: 2024-02-20 | End: 2024-02-20

## 2024-02-20 RX ADMIN — SUMATRIPTAN SUCCINATE 50 MILLIGRAM(S): 4 INJECTION, SOLUTION SUBCUTANEOUS at 19:38

## 2024-02-20 RX ADMIN — Medication 30 MILLIGRAM(S): at 19:39

## 2024-02-20 RX ADMIN — Medication 10 MILLIGRAM(S): at 19:38

## 2024-02-20 NOTE — ED ADULT NURSE NOTE - OBJECTIVE STATEMENT
Pt A&Ox4 arrives c/o headache x2 weeks accompanied by nausea and body aches. Denies vision changes, numbness, tingling, dizziness. PMHx of migraines. Airway patent. Respirations even and unlabored. Medications given as prescribed

## 2024-02-20 NOTE — ED PROVIDER NOTE - CLINICAL SUMMARY MEDICAL DECISION MAKING FREE TEXT BOX
The patient states her headache is improved, she is in no distress, and has a completely normal neurologic examination.   Low clinical suspicion for intracranial pathology.  Patient stable for discharge and outpatient neurology follow-up

## 2024-02-20 NOTE — ED PROVIDER NOTE - PATIENT PORTAL LINK FT
You can access the FollowMyHealth Patient Portal offered by [facility] by registering at the following website: [email or web address]. By joining [employer]’s FollowMyHealth portal, you will also be able to view your health information using other applications (apps) compatible with our system.

## 2024-02-20 NOTE — ED PROVIDER NOTE - OBJECTIVE STATEMENT
38-year-old female presents with headache.  Patient reports that she has had 2 weeks of persistent frontal throbbing headache.  Associated nausea, but no vomiting, blurry vision, weakness, slurred speech.  Patient reports tingling in bilateral hands.  In addition patient reports suprapubic discomfort that is radiating to the left flank.  No vomiting, diarrhea, vaginal bleeding or discharge, bloody stool

## 2024-02-20 NOTE — ED ADULT NURSE NOTE - NSFALLUNIVINTERV_ED_ALL_ED
Bed/Stretcher in lowest position, wheels locked, appropriate side rails in place/Call bell, personal items and telephone in reach/Instruct patient to call for assistance before getting out of bed/chair/stretcher/Non-slip footwear applied when patient is off stretcher/[place] to call system/Physically safe environment - no spills, clutter or unnecessary equipment/Purposeful proactive rounding/Room/bathroom lighting operational, light cord in reach

## 2024-02-20 NOTE — ED PROVIDER NOTE - CARE PROVIDER_API CALL
Quan Gallo  Neurology  [address] [place] [address], [facility] [address]  [place] [address]  Phone: [phone]  Fax: [phone]  Follow Up Time:

## 2024-02-20 NOTE — ED ADULT TRIAGE NOTE - MODE OF ARRIVAL
Spoke with Charlene with IR we were trying to figure out when to schedule this pt for her TACE procedure ([employer] recommendations). I was putting her on Dr. Perry schedule for 2/20/20 to see prior to TACE on 2/21/20. Charlene called the pt to schedule the procedure and the pt told her that she was in the hospital that she had a CVA. Charlene told her that when she got to feeling better to call our office to set up for the procedure.   Private Auto Walk in

## 2024-02-23 ENCOUNTER — EMERGENCY (EMERGENCY)
Facility: HOSPITAL | Age: 39
LOS: 1 days | End: 2024-02-23
Attending: STUDENT IN AN ORGANIZED HEALTH CARE EDUCATION/TRAINING PROGRAM
Payer: COMMERCIAL

## 2024-02-23 VITALS
OXYGEN SATURATION: 98 % | SYSTOLIC BLOOD PRESSURE: 107 MMHG | RESPIRATION RATE: 18 BRPM | HEIGHT: 63 IN | DIASTOLIC BLOOD PRESSURE: 70 MMHG | WEIGHT: 153.66 LBS | TEMPERATURE: 98 F | HEART RATE: 83 BPM

## 2024-02-23 LAB
ALBUMIN SERPL ELPH-MCNC: 4.5 G/DL — SIGNIFICANT CHANGE UP (ref 3.3–5.2)
ALP SERPL-CCNC: 67 U/L — SIGNIFICANT CHANGE UP (ref 40–120)
ALT FLD-CCNC: 8 U/L — SIGNIFICANT CHANGE UP
ANION GAP SERPL CALC-SCNC: 12 MMOL/L — SIGNIFICANT CHANGE UP (ref 5–17)
AST SERPL-CCNC: 14 U/L — SIGNIFICANT CHANGE UP
BASOPHILS # BLD AUTO: 0.03 K/UL — SIGNIFICANT CHANGE UP (ref 0–0.2)
BASOPHILS NFR BLD AUTO: 0.5 % — SIGNIFICANT CHANGE UP (ref 0–2)
BILIRUB SERPL-MCNC: 0.4 MG/DL — SIGNIFICANT CHANGE UP (ref 0.4–2)
BUN SERPL-MCNC: 15.1 MG/DL — SIGNIFICANT CHANGE UP (ref 8–20)
CALCIUM SERPL-MCNC: 9.1 MG/DL — SIGNIFICANT CHANGE UP (ref 8.4–10.5)
CHLORIDE SERPL-SCNC: 103 MMOL/L — SIGNIFICANT CHANGE UP (ref 96–108)
CO2 SERPL-SCNC: 24 MMOL/L — SIGNIFICANT CHANGE UP (ref 22–29)
CREAT SERPL-MCNC: 0.57 MG/DL — SIGNIFICANT CHANGE UP (ref 0.5–1.3)
EGFR: 121 ML/MIN/1.73M2 — SIGNIFICANT CHANGE UP
EOSINOPHIL # BLD AUTO: 0.01 K/UL — SIGNIFICANT CHANGE UP (ref 0–0.5)
EOSINOPHIL NFR BLD AUTO: 0.2 % — SIGNIFICANT CHANGE UP (ref 0–6)
GLUCOSE SERPL-MCNC: 111 MG/DL — HIGH (ref 70–99)
HCG SERPL-ACNC: <4 MIU/ML — SIGNIFICANT CHANGE UP
HCT VFR BLD CALC: 42.3 % — SIGNIFICANT CHANGE UP (ref 34.5–45)
HGB BLD-MCNC: 14.1 G/DL — SIGNIFICANT CHANGE UP (ref 11.5–15.5)
HIV 1 & 2 AB SERPL IA.RAPID: SIGNIFICANT CHANGE UP
IMM GRANULOCYTES NFR BLD AUTO: 0.3 % — SIGNIFICANT CHANGE UP (ref 0–0.9)
LYMPHOCYTES # BLD AUTO: 0.89 K/UL — LOW (ref 1–3.3)
LYMPHOCYTES # BLD AUTO: 15.3 % — SIGNIFICANT CHANGE UP (ref 13–44)
MAGNESIUM SERPL-MCNC: 2 MG/DL — SIGNIFICANT CHANGE UP (ref 1.6–2.6)
MCHC RBC-ENTMCNC: 29 PG — SIGNIFICANT CHANGE UP (ref 27–34)
MCHC RBC-ENTMCNC: 33.3 GM/DL — SIGNIFICANT CHANGE UP (ref 32–36)
MCV RBC AUTO: 87 FL — SIGNIFICANT CHANGE UP (ref 80–100)
MONOCYTES # BLD AUTO: 0.42 K/UL — SIGNIFICANT CHANGE UP (ref 0–0.9)
MONOCYTES NFR BLD AUTO: 7.2 % — SIGNIFICANT CHANGE UP (ref 2–14)
NEUTROPHILS # BLD AUTO: 4.44 K/UL — SIGNIFICANT CHANGE UP (ref 1.8–7.4)
NEUTROPHILS NFR BLD AUTO: 76.5 % — SIGNIFICANT CHANGE UP (ref 43–77)
PLATELET # BLD AUTO: 150 K/UL — SIGNIFICANT CHANGE UP (ref 150–400)
POTASSIUM SERPL-MCNC: 4 MMOL/L — SIGNIFICANT CHANGE UP (ref 3.5–5.3)
POTASSIUM SERPL-SCNC: 4 MMOL/L — SIGNIFICANT CHANGE UP (ref 3.5–5.3)
PROT SERPL-MCNC: 7.2 G/DL — SIGNIFICANT CHANGE UP (ref 6.6–8.7)
RBC # BLD: 4.86 M/UL — SIGNIFICANT CHANGE UP (ref 3.8–5.2)
RBC # FLD: 12.7 % — SIGNIFICANT CHANGE UP (ref 10.3–14.5)
SODIUM SERPL-SCNC: 139 MMOL/L — SIGNIFICANT CHANGE UP (ref 135–145)
T4 AB SER-ACNC: 6.9 UG/DL — SIGNIFICANT CHANGE UP (ref 4.5–12)
TSH SERPL-MCNC: 0.71 UIU/ML — SIGNIFICANT CHANGE UP (ref 0.27–4.2)
WBC # BLD: 5.81 K/UL — SIGNIFICANT CHANGE UP (ref 3.8–10.5)
WBC # FLD AUTO: 5.81 K/UL — SIGNIFICANT CHANGE UP (ref 3.8–10.5)

## 2024-02-23 PROCEDURE — 84702 CHORIONIC GONADOTROPIN TEST: CPT

## 2024-02-23 PROCEDURE — 85025 COMPLETE CBC W/AUTO DIFF WBC: CPT

## 2024-02-23 PROCEDURE — 96374 THER/PROPH/DIAG INJ IV PUSH: CPT

## 2024-02-23 PROCEDURE — 93010 ELECTROCARDIOGRAM REPORT: CPT

## 2024-02-23 PROCEDURE — 80053 COMPREHEN METABOLIC PANEL: CPT

## 2024-02-23 PROCEDURE — 70450 CT HEAD/BRAIN W/O DYE: CPT | Mod: MC

## 2024-02-23 PROCEDURE — 36415 COLL VENOUS BLD VENIPUNCTURE: CPT

## 2024-02-23 PROCEDURE — 99285 EMERGENCY DEPT VISIT HI MDM: CPT | Mod: 25

## 2024-02-23 PROCEDURE — 83735 ASSAY OF MAGNESIUM: CPT

## 2024-02-23 PROCEDURE — 84443 ASSAY THYROID STIM HORMONE: CPT

## 2024-02-23 PROCEDURE — 93005 ELECTROCARDIOGRAM TRACING: CPT

## 2024-02-23 PROCEDURE — 84436 ASSAY OF TOTAL THYROXINE: CPT

## 2024-02-23 PROCEDURE — 86703 HIV-1/HIV-2 1 RESULT ANTBDY: CPT

## 2024-02-23 PROCEDURE — 99285 EMERGENCY DEPT VISIT HI MDM: CPT

## 2024-02-23 PROCEDURE — 70450 CT HEAD/BRAIN W/O DYE: CPT | Mod: 26,MC

## 2024-02-23 RX ORDER — KETOROLAC TROMETHAMINE 30 MG/ML
30 SYRINGE (ML) INJECTION ONCE
Refills: 0 | Status: DISCONTINUED | OUTPATIENT
Start: 2024-02-23 | End: 2024-02-23

## 2024-02-23 RX ORDER — METHOCARBAMOL 500 MG/1
1500 TABLET, FILM COATED ORAL ONCE
Refills: 0 | Status: COMPLETED | OUTPATIENT
Start: 2024-02-23 | End: 2024-02-23

## 2024-02-23 RX ADMIN — Medication 30 MILLIGRAM(S): at 12:30

## 2024-02-23 RX ADMIN — METHOCARBAMOL 1500 MILLIGRAM(S): 500 TABLET, FILM COATED ORAL at 11:02

## 2024-02-23 RX ADMIN — Medication 30 MILLIGRAM(S): at 11:03

## 2024-02-23 NOTE — ED PROVIDER NOTE - OBJECTIVE STATEMENT
38-year-old female with no past medical history presents with headache and paresthesias.  Patient states for the past 2 weeks with frontal throbbing headache as well as intermittent paresthesias to the upper and lower extremities.  Patient states yesterday she started to feel paresthesias to her face as well.  Patient also reports neck pain at times.  Patient was here couple days ago for similar symptoms was diagnosed with a migraine given his sumatriptan.  Patient reports minimal relief with sumatriptan.  Patient denies falls or head trauma. Pt denies fevers/chills, loc, focal neuro deficits, cp/sob/palp, cough, abd pain/n/v/d, urinary symptoms, recent travel

## 2024-02-23 NOTE — ED PROVIDER NOTE - PHYSICAL EXAMINATION
Const: Awake, alert and oriented. In no acute distress. Well appearing.  HEENT: NC/AT. Moist mucous membranes. PERRL TM clear  bl  Eyes: No scleral icterus. EOMI.  Neck:. Soft and supple. Full ROM without pain. + left paracervical ttp   Cardiac: Regular rate and regular rhythm. +S1/S2. Peripheral pulses 2+ and symmetric. No LE edema.  Resp: Speaking in full sentences. No evidence of respiratory distress. No wheezes, rales or rhonchi.  Abd: Soft, non-tender, non-distended. Normal bowel sounds in all 4 quadrants. No guarding or rebound.  Back: Spine midline and non-tender. No CVAT.  Skin: No rashes, abrasions or lacerations.  Lymph: No cervical lymphadenopathy.  Neuro: A&Ox3, moving all extremities symmetrically, follows commands, motor carmella upper and lower ext 5/5, sensory symm and intact CN 2-12 grossly intact, no ataxia, no nystagmus, no dysmetria, no ddk, symm carmella, no pronator drift

## 2024-02-23 NOTE — ED ADULT NURSE NOTE - OBJECTIVE STATEMENT
Pt presents to the ED A&Ox4 c/o headache and b/l upper and lower extremity numbness evaluated at [facility] 2 days ago diagnosed with migraines. Last night she began to has left sided facial numbness. Pt RR even and unlabored. Pt denies recent illness. Pt denies chest/abdominal pain and SOB currently.

## 2024-02-23 NOTE — ED PROVIDER NOTE - CLINICAL SUMMARY MEDICAL DECISION MAKING FREE TEXT BOX
38-year-old female with no past medical history presents with headache and paresthesias.  patient neuro intact here.  Will rule out electrolyte abnormalities as well as intracranial pathology due to second visit for similar symptoms and chronicity of symptoms.  Labs, CT

## 2024-02-23 NOTE — ED PROVIDER NOTE - PATIENT PORTAL LINK FT
You can access the FollowMyHealth Patient Portal offered by [facility] by registering at the following website: [email or web address]. By joining [employer]’s FollowMyHealth portal, you will also be able to view your health information using other applications (apps) compatible with our system.

## 2024-02-23 NOTE — ED PROVIDER NOTE - CARE PROVIDER_API CALL
Srinivasa Lynn  Neurology  [address] [place] [address], [facility] [address]  [place] [address]  Phone: [phone]  Fax: [phone]  Follow Up Time: 7-10 Days

## 2024-02-23 NOTE — ED ADULT TRIAGE NOTE - CHIEF COMPLAINT QUOTE
Headache associated with bilateral arm and bilateral leg numbness, seen at [facility] 2 days ago. Presents today due to left sided facial numbness. PMH migraines.

## 2024-02-23 NOTE — ED ADULT NURSE NOTE - CHIEF COMPLAINT QUOTE
Headache associated with bilateral arm and bilateral leg numbness, seen at [facility] 2 days ago. Presents today due to left sided facial numbness. PMH migraines.

## 2024-02-23 NOTE — ED PROVIDER NOTE - NSFOLLOWUPINSTRUCTIONS_ED_ALL_ED_FT
1)Follow up with your PCP in 1 week  2)Follow up with neurology clinic within 1 week  Return to the emergency room if you are experiencing any new or worsening symptoms    Headache    A headache is pain or discomfort felt around the head or neck area. The specific cause of a headache may not be found as there are many types including tension headaches, migraine headaches, and cluster headaches. Watch your condition for any changes. Things you can do to manage your pain include taking over the counter and prescription medications as instructed by your health care provider, lying down in a dark quiet room, limiting stress, getting regular sleep, and refraining from alcohol and tobacco products.    SEEK IMMEDIATE MEDICAL CARE IF YOU HAVE ANY OF THE FOLLOWING SYMPTOMS: fever, vomiting, stiff neck, loss of vision, problems with speech, muscle weakness, loss of balance, trouble walking, passing out, or confusion.

## 2024-02-23 NOTE — ED ADULT NURSE NOTE - NSFALLUNIVINTERV_ED_ALL_ED
Bed/Stretcher in lowest position, wheels locked, appropriate side rails in place/Call bell, personal items and telephone in reach/Instruct patient to call for assistance before getting out of bed/chair/stretcher/Non-slip footwear applied when patient is off stretcher/[place] to call system/Physically safe environment - no spills, clutter or unnecessary equipment/Purposeful proactive rounding/Room/bathroom lighting operational, light cord in reach

## 2024-02-23 NOTE — ED PROVIDER NOTE - PROGRESS NOTE DETAILS
Pt reports improvement of symptoms in the ED. A&O x3 with no gross focal neuro deficits.   EKG NSR at 80 bpm without any acute ischemic changes.  Labs reviewed and grossly unremarkable  CT head: no acute findings.  Instructed to f/u with neuro/pcp within 1 week  Strict ED return precautions given if any new or worsening symptoms.

## 2024-04-26 ENCOUNTER — RX ONLY (RX ONLY)
Age: 39
End: 2024-04-26

## 2024-04-26 ENCOUNTER — OFFICE (OUTPATIENT)
Dept: URBAN - METROPOLITAN AREA CLINIC 94 | Facility: CLINIC | Age: 39
Setting detail: OPHTHALMOLOGY
End: 2024-04-26
Payer: COMMERCIAL

## 2024-04-26 DIAGNOSIS — H11.042: ICD-10-CM

## 2024-04-26 DIAGNOSIS — H16.221: ICD-10-CM

## 2024-04-26 DIAGNOSIS — H16.222: ICD-10-CM

## 2024-04-26 PROCEDURE — 83861 MICROFLUID ANALY TEARS: CPT | Mod: RT | Performed by: OPHTHALMOLOGY

## 2024-04-26 PROCEDURE — 92285 EXTERNAL OCULAR PHOTOGRAPHY: CPT | Performed by: OPHTHALMOLOGY

## 2024-04-26 PROCEDURE — 99214 OFFICE O/P EST MOD 30 MIN: CPT | Performed by: OPHTHALMOLOGY

## 2024-04-26 PROCEDURE — 83861 MICROFLUID ANALY TEARS: CPT | Mod: LT | Performed by: OPHTHALMOLOGY

## 2024-04-26 ASSESSMENT — LID EXAM ASSESSMENTS
OD_BLEPHARITIS: T
OS_BLEPHARITIS: T

## 2024-05-20 ENCOUNTER — ASC (OUTPATIENT)
Dept: URBAN - METROPOLITAN AREA SURGERY 8 | Facility: SURGERY | Age: 39
Setting detail: OPHTHALMOLOGY
End: 2024-05-20
Payer: COMMERCIAL

## 2024-05-20 DIAGNOSIS — H11.042: ICD-10-CM

## 2024-05-20 PROCEDURE — 65426 REMOVAL OF EYE LESION: CPT | Mod: LT | Performed by: OPHTHALMOLOGY

## 2024-05-21 ENCOUNTER — OFFICE (OUTPATIENT)
Dept: URBAN - METROPOLITAN AREA CLINIC 94 | Facility: CLINIC | Age: 39
Setting detail: OPHTHALMOLOGY
End: 2024-05-21
Payer: COMMERCIAL

## 2024-05-21 DIAGNOSIS — H11.042: ICD-10-CM

## 2024-05-21 PROBLEM — H16.223 DRY EYE SYNDROME K SICCA; ,, BOTH EYES: Status: ACTIVE | Noted: 2024-05-21

## 2024-05-21 PROCEDURE — 92012 INTRM OPH EXAM EST PATIENT: CPT | Performed by: OPHTHALMOLOGY

## 2024-05-21 ASSESSMENT — LID EXAM ASSESSMENTS
OS_BLEPHARITIS: T
OD_BLEPHARITIS: T

## 2024-06-03 ENCOUNTER — OFFICE (OUTPATIENT)
Dept: URBAN - METROPOLITAN AREA CLINIC 94 | Facility: CLINIC | Age: 39
Setting detail: OPHTHALMOLOGY
End: 2024-06-03
Payer: COMMERCIAL

## 2024-06-03 ENCOUNTER — RX ONLY (RX ONLY)
Age: 39
End: 2024-06-03

## 2024-06-03 DIAGNOSIS — H11.042: ICD-10-CM

## 2024-06-03 PROCEDURE — 99024 POSTOP FOLLOW-UP VISIT: CPT | Performed by: PHYSICIAN ASSISTANT

## 2024-06-03 ASSESSMENT — LID EXAM ASSESSMENTS
OD_BLEPHARITIS: T
OS_BLEPHARITIS: T

## 2024-06-03 ASSESSMENT — CONFRONTATIONAL VISUAL FIELD TEST (CVF)
OS_FINDINGS: FULL
OD_FINDINGS: FULL

## 2024-06-26 ENCOUNTER — APPOINTMENT (OUTPATIENT)
Dept: NEUROLOGY | Facility: CLINIC | Age: 39
End: 2024-06-26

## 2024-07-13 ENCOUNTER — OFFICE (OUTPATIENT)
Dept: URBAN - METROPOLITAN AREA CLINIC 94 | Facility: CLINIC | Age: 39
Setting detail: OPHTHALMOLOGY
End: 2024-07-13
Payer: COMMERCIAL

## 2024-07-13 DIAGNOSIS — H16.223: ICD-10-CM

## 2024-07-13 DIAGNOSIS — H11.042: ICD-10-CM

## 2024-07-13 PROCEDURE — 99024 POSTOP FOLLOW-UP VISIT: CPT | Performed by: PHYSICIAN ASSISTANT

## 2024-07-13 ASSESSMENT — LID EXAM ASSESSMENTS
OD_BLEPHARITIS: T
OS_BLEPHARITIS: T

## 2024-07-13 ASSESSMENT — CONFRONTATIONAL VISUAL FIELD TEST (CVF)
OS_FINDINGS: FULL
OD_FINDINGS: FULL

## 2024-07-25 ENCOUNTER — OFFICE (OUTPATIENT)
Dept: URBAN - METROPOLITAN AREA CLINIC 63 | Facility: CLINIC | Age: 39
Setting detail: OPHTHALMOLOGY
End: 2024-07-25
Payer: COMMERCIAL

## 2024-07-25 DIAGNOSIS — H11.042: ICD-10-CM

## 2024-07-25 PROCEDURE — 99024 POSTOP FOLLOW-UP VISIT: CPT | Performed by: OPHTHALMOLOGY

## 2024-07-25 ASSESSMENT — LID EXAM ASSESSMENTS
OD_BLEPHARITIS: T
OS_BLEPHARITIS: T

## 2024-07-25 ASSESSMENT — CONFRONTATIONAL VISUAL FIELD TEST (CVF)
OS_FINDINGS: FULL
OD_FINDINGS: FULL

## 2024-08-08 ENCOUNTER — OFFICE (OUTPATIENT)
Dept: URBAN - METROPOLITAN AREA CLINIC 63 | Facility: CLINIC | Age: 39
Setting detail: OPHTHALMOLOGY
End: 2024-08-08
Payer: COMMERCIAL

## 2024-08-08 DIAGNOSIS — H11.042: ICD-10-CM

## 2024-08-08 PROCEDURE — 99024 POSTOP FOLLOW-UP VISIT: CPT | Performed by: OPHTHALMOLOGY

## 2024-08-08 ASSESSMENT — CONFRONTATIONAL VISUAL FIELD TEST (CVF)
OD_FINDINGS: FULL
OS_FINDINGS: FULL

## 2024-08-08 ASSESSMENT — LID EXAM ASSESSMENTS
OS_BLEPHARITIS: T
OD_BLEPHARITIS: T

## 2024-08-24 ENCOUNTER — OFFICE (OUTPATIENT)
Dept: URBAN - METROPOLITAN AREA CLINIC 94 | Facility: CLINIC | Age: 39
Setting detail: OPHTHALMOLOGY
End: 2024-08-24
Payer: COMMERCIAL

## 2024-08-24 DIAGNOSIS — H01.001: ICD-10-CM

## 2024-08-24 DIAGNOSIS — H01.004: ICD-10-CM

## 2024-08-24 DIAGNOSIS — H16.223: ICD-10-CM

## 2024-08-24 PROCEDURE — 99213 OFFICE O/P EST LOW 20 MIN: CPT | Performed by: OPTOMETRIST

## 2024-08-24 ASSESSMENT — LID EXAM ASSESSMENTS
OS_BLEPHARITIS: T
OD_BLEPHARITIS: T

## 2024-08-24 ASSESSMENT — CONFRONTATIONAL VISUAL FIELD TEST (CVF)
OD_FINDINGS: FULL
OS_FINDINGS: FULL

## 2024-10-11 ENCOUNTER — OFFICE (OUTPATIENT)
Dept: URBAN - METROPOLITAN AREA CLINIC 94 | Facility: CLINIC | Age: 39
Setting detail: OPHTHALMOLOGY
End: 2024-10-11
Payer: COMMERCIAL

## 2024-10-11 DIAGNOSIS — H11.042: ICD-10-CM

## 2024-10-11 DIAGNOSIS — H11.151: ICD-10-CM

## 2024-10-11 DIAGNOSIS — H16.222: ICD-10-CM

## 2024-10-11 DIAGNOSIS — H01.001: ICD-10-CM

## 2024-10-11 DIAGNOSIS — H01.004: ICD-10-CM

## 2024-10-11 DIAGNOSIS — H16.221: ICD-10-CM

## 2024-10-11 PROBLEM — H16.223 DRY EYE SYNDROME K SICCA; RIGHT EYE, LEFT EYE, BOTH EYES: Status: ACTIVE | Noted: 2024-10-11

## 2024-10-11 PROCEDURE — 83861 MICROFLUID ANALY TEARS: CPT | Mod: RT | Performed by: OPHTHALMOLOGY

## 2024-10-11 PROCEDURE — 83861 MICROFLUID ANALY TEARS: CPT | Mod: LT | Performed by: OPHTHALMOLOGY

## 2024-10-11 PROCEDURE — 92012 INTRM OPH EXAM EST PATIENT: CPT | Performed by: OPHTHALMOLOGY

## 2024-10-11 ASSESSMENT — REFRACTION_CURRENTRX
OD_AXIS: 173
OD_OVR_VA: 20/
OD_CYLINDER: -0.50
OS_OVR_VA: 20/
OD_SPHERE: -1.00
OS_CYLINDER: SPH
OS_SPHERE: -1.25

## 2024-10-11 ASSESSMENT — LID EXAM ASSESSMENTS
OS_BLEPHARITIS: T
OD_BLEPHARITIS: T

## 2024-10-11 ASSESSMENT — REFRACTION_MANIFEST
OD_VA1: 20/20
OD_VA1: 20/20
OS_SPHERE: -0.75
OS_AXIS: 105
OD_SPHERE: -1.00
OS_SPHERE: -1.25
OS_VA1: 20/20
OD_CYLINDER: -0.50
OD_AXIS: 170
OD_AXIS: 170
OS_VA1: 20/20
OD_CYLINDER: -0.25
OD_SPHERE: -1.00
OS_CYLINDER: -0.50

## 2024-10-11 ASSESSMENT — KERATOMETRY
OS_AXISANGLE_DEGREES: 034
OD_K1POWER_DIOPTERS: 46.00
OD_AXISANGLE_DEGREES: 068
OD_K2POWER_DIOPTERS: 47.00
OS_K1POWER_DIOPTERS: 46.25
OS_K2POWER_DIOPTERS: 46.50

## 2024-10-11 ASSESSMENT — REFRACTION_AUTOREFRACTION
OD_CYLINDER: -0.25
OS_CYLINDER: -0.75
OS_SPHERE: -0.50
OS_AXIS: 106
OD_SPHERE: -1.00
OD_AXIS: 168

## 2024-10-11 ASSESSMENT — CONFRONTATIONAL VISUAL FIELD TEST (CVF)
OS_FINDINGS: FULL
OD_FINDINGS: FULL

## 2024-10-11 ASSESSMENT — SUPERFICIAL PUNCTATE KERATITIS (SPK)
OD_SPK: 1+
OS_SPK: 1+

## 2024-10-11 ASSESSMENT — VISUAL ACUITY
OD_BCVA: 20/25
OS_BCVA: 20/20

## 2025-02-13 ENCOUNTER — OFFICE (OUTPATIENT)
Dept: URBAN - METROPOLITAN AREA CLINIC 112 | Facility: CLINIC | Age: 40
Setting detail: OPHTHALMOLOGY
End: 2025-02-13
Payer: COMMERCIAL

## 2025-02-13 DIAGNOSIS — H00.11: ICD-10-CM

## 2025-02-13 PROCEDURE — 92014 COMPRE OPH EXAM EST PT 1/>: CPT | Performed by: PHYSICIAN ASSISTANT

## 2025-02-13 ASSESSMENT — LID EXAM ASSESSMENTS
OD_BLEPHARITIS: T
OS_BLEPHARITIS: T

## 2025-02-13 ASSESSMENT — REFRACTION_MANIFEST
OS_VA1: 20/20
OD_CYLINDER: -0.25
OD_VA1: 20/20
OS_AXIS: 105
OD_AXIS: 170
OD_CYLINDER: -0.50
OS_CYLINDER: -0.50
OD_VA1: 20/20
OS_VA1: 20/20
OS_SPHERE: -0.75
OS_SPHERE: -1.25
OD_SPHERE: -1.00
OD_AXIS: 170
OD_SPHERE: -1.00

## 2025-02-13 ASSESSMENT — CONFRONTATIONAL VISUAL FIELD TEST (CVF)
OS_FINDINGS: FULL
OD_FINDINGS: FULL

## 2025-02-13 ASSESSMENT — TONOMETRY
OD_IOP_MMHG: 12
OS_IOP_MMHG: 14

## 2025-02-13 ASSESSMENT — KERATOMETRY
OD_K1POWER_DIOPTERS: 46.00
OS_AXISANGLE_DEGREES: 017
OD_AXISANGLE_DEGREES: 039
OD_K2POWER_DIOPTERS: 46.50
OS_K1POWER_DIOPTERS: 46.25
OS_K2POWER_DIOPTERS: 47.00

## 2025-02-13 ASSESSMENT — VISUAL ACUITY
OD_BCVA: 20/25+2
OS_BCVA: 20/20

## 2025-02-13 ASSESSMENT — REFRACTION_AUTOREFRACTION
OS_CYLINDER: -1.00
OD_AXIS: 117
OS_AXIS: 104
OS_SPHERE: -0.50
OD_SPHERE: -0.50
OD_CYLINDER: -0.50

## 2025-02-13 ASSESSMENT — REFRACTION_CURRENTRX
OD_SPHERE: -1.00
OS_SPHERE: -1.25
OD_AXIS: 173
OD_CYLINDER: -0.50
OD_OVR_VA: 20/
OS_CYLINDER: SPH
OS_OVR_VA: 20/

## 2025-02-13 ASSESSMENT — SUPERFICIAL PUNCTATE KERATITIS (SPK)
OS_SPK: 1+
OD_SPK: 1+

## 2025-02-25 ENCOUNTER — RX ONLY (RX ONLY)
Age: 40
End: 2025-02-25

## 2025-02-25 ENCOUNTER — OFFICE (OUTPATIENT)
Dept: URBAN - METROPOLITAN AREA CLINIC 112 | Facility: CLINIC | Age: 40
Setting detail: OPHTHALMOLOGY
End: 2025-02-25
Payer: COMMERCIAL

## 2025-02-25 DIAGNOSIS — H00.11: ICD-10-CM

## 2025-02-25 PROBLEM — H01.00 BLEPHARITIS; RIGHT UPPER LID, LEFT UPPER LID: Status: ACTIVE | Noted: 2025-02-13

## 2025-02-25 PROCEDURE — 92285 EXTERNAL OCULAR PHOTOGRAPHY: CPT | Performed by: OPHTHALMOLOGY

## 2025-02-25 PROCEDURE — 67800 REMOVE EYELID LESION: CPT | Mod: E3 | Performed by: OPHTHALMOLOGY

## 2025-02-25 ASSESSMENT — SUPERFICIAL PUNCTATE KERATITIS (SPK)
OD_SPK: 1+
OS_SPK: 1+

## 2025-02-25 ASSESSMENT — CONFRONTATIONAL VISUAL FIELD TEST (CVF)
OD_FINDINGS: FULL
OS_FINDINGS: FULL

## 2025-02-25 ASSESSMENT — TONOMETRY
OS_IOP_MMHG: 12
OD_IOP_MMHG: 16

## 2025-02-25 ASSESSMENT — LID EXAM ASSESSMENTS
OD_BLEPHARITIS: T
OS_BLEPHARITIS: T

## 2025-02-26 ENCOUNTER — RX ONLY (RX ONLY)
Age: 40
End: 2025-02-26

## 2025-02-26 ASSESSMENT — REFRACTION_MANIFEST
OS_CYLINDER: -0.50
OD_CYLINDER: -0.50
OS_AXIS: 105
OS_VA1: 20/20
OD_VA1: 20/20
OS_VA1: 20/20
OS_SPHERE: -1.25
OD_SPHERE: -1.00
OD_AXIS: 170
OD_VA1: 20/20
OD_AXIS: 170
OD_SPHERE: -1.00
OS_SPHERE: -0.75
OD_CYLINDER: -0.25

## 2025-02-26 ASSESSMENT — REFRACTION_AUTOREFRACTION
OS_SPHERE: -0.50
OD_AXIS: 117
OS_CYLINDER: -1.00
OD_SPHERE: -0.50
OS_AXIS: 104
OD_CYLINDER: -0.50

## 2025-02-26 ASSESSMENT — KERATOMETRY
OD_K1POWER_DIOPTERS: 46.00
OD_AXISANGLE_DEGREES: 039
OS_K1POWER_DIOPTERS: 46.25
OS_AXISANGLE_DEGREES: 017
OD_K2POWER_DIOPTERS: 46.50
OS_K2POWER_DIOPTERS: 47.00

## 2025-02-26 ASSESSMENT — REFRACTION_CURRENTRX
OS_SPHERE: -1.25
OD_SPHERE: -1.00
OD_AXIS: 173
OS_CYLINDER: SPH
OD_CYLINDER: -0.50
OD_OVR_VA: 20/
OS_OVR_VA: 20/

## 2025-02-26 ASSESSMENT — VISUAL ACUITY
OS_BCVA: 20/20
OD_BCVA: 20/20

## 2025-03-14 ENCOUNTER — OFFICE (OUTPATIENT)
Dept: URBAN - METROPOLITAN AREA CLINIC 94 | Facility: CLINIC | Age: 40
Setting detail: OPHTHALMOLOGY
End: 2025-03-14
Payer: COMMERCIAL

## 2025-03-14 DIAGNOSIS — H00.11: ICD-10-CM

## 2025-03-14 PROCEDURE — 92012 INTRM OPH EXAM EST PATIENT: CPT | Performed by: OPHTHALMOLOGY

## 2025-03-14 ASSESSMENT — REFRACTION_CURRENTRX
OD_AXIS: 173
OS_OVR_VA: 20/
OD_CYLINDER: -0.50
OS_SPHERE: -1.25
OS_CYLINDER: SPH
OD_SPHERE: -1.00
OD_OVR_VA: 20/

## 2025-03-14 ASSESSMENT — REFRACTION_AUTOREFRACTION
OD_CYLINDER: -0.50
OS_CYLINDER: -1.00
OS_SPHERE: -0.50
OD_AXIS: 117
OS_AXIS: 104
OD_SPHERE: -0.50

## 2025-03-14 ASSESSMENT — LID EXAM ASSESSMENTS
OD_BLEPHARITIS: T
OS_BLEPHARITIS: T

## 2025-03-14 ASSESSMENT — KERATOMETRY
OD_K2POWER_DIOPTERS: 46.50
OD_AXISANGLE_DEGREES: 039
OS_K1POWER_DIOPTERS: 46.25
OD_K1POWER_DIOPTERS: 46.00
OS_K2POWER_DIOPTERS: 47.00
OS_AXISANGLE_DEGREES: 017

## 2025-03-14 ASSESSMENT — SUPERFICIAL PUNCTATE KERATITIS (SPK)
OD_SPK: 1+
OS_SPK: 1+

## 2025-03-14 ASSESSMENT — REFRACTION_MANIFEST
OS_AXIS: 105
OD_CYLINDER: -0.50
OS_VA1: 20/20
OD_SPHERE: -1.00
OD_AXIS: 170
OD_VA1: 20/20
OS_SPHERE: -1.25
OD_AXIS: 170
OS_CYLINDER: -0.50
OS_VA1: 20/20
OD_SPHERE: -1.00
OD_CYLINDER: -0.25
OD_VA1: 20/20
OS_SPHERE: -0.75

## 2025-03-14 ASSESSMENT — CONFRONTATIONAL VISUAL FIELD TEST (CVF)
OS_FINDINGS: FULL
OD_FINDINGS: FULL

## 2025-03-14 ASSESSMENT — VISUAL ACUITY
OS_BCVA: 20/30-1
OD_BCVA: 20/25+1

## 2025-03-14 ASSESSMENT — TONOMETRY
OS_IOP_MMHG: 14
OD_IOP_MMHG: 16

## 2025-05-24 ENCOUNTER — OFFICE (OUTPATIENT)
Dept: URBAN - METROPOLITAN AREA CLINIC 94 | Facility: CLINIC | Age: 40
Setting detail: OPHTHALMOLOGY
End: 2025-05-24
Payer: COMMERCIAL

## 2025-05-24 ENCOUNTER — RX ONLY (RX ONLY)
Age: 40
End: 2025-05-24

## 2025-05-24 DIAGNOSIS — H16.223: ICD-10-CM

## 2025-05-24 DIAGNOSIS — H01.004: ICD-10-CM

## 2025-05-24 DIAGNOSIS — H01.001: ICD-10-CM

## 2025-05-24 PROBLEM — H10.433 ALLERGIC CONJUNCTIVITIS; BOTH EYES: Status: ACTIVE | Noted: 2025-05-24

## 2025-05-24 PROCEDURE — 92014 COMPRE OPH EXAM EST PT 1/>: CPT | Performed by: OPTOMETRIST

## 2025-05-24 ASSESSMENT — REFRACTION_CURRENTRX
OD_AXIS: 165
OS_CYLINDER: SPH
OD_CYLINDER: -0.50
OD_AXIS: 173
OD_CYLINDER: -0.50
OD_OVR_VA: 20/
OD_OVR_VA: 20/
OS_OVR_VA: 20/
OS_SPHERE: -1.25
OS_SPHERE: -1.25
OD_SPHERE: -1.00
OD_SPHERE: -1.00
OS_OVR_VA: 20/
OD_VPRISM_DIRECTION: SV
OS_VPRISM_DIRECTION: SV

## 2025-05-24 ASSESSMENT — REFRACTION_MANIFEST
OD_VA1: 20/20
OS_CYLINDER: -0.50
OD_SPHERE: -1.00
OD_SPHERE: -1.00
OS_VA1: 20/20
OS_AXIS: 105
OD_CYLINDER: -0.25
OD_AXIS: 170
OD_CYLINDER: -0.50
OD_VA1: 20/20
OD_AXIS: 170
OD_CYLINDER: -0.25
OS_SPHERE: -0.75
OS_SPHERE: -1.00
OD_AXIS: 170
OS_CYLINDER: -0.50
OD_SPHERE: -1.00
OS_SPHERE: -1.25
OD_VA1: 20/20
OS_AXIS: 105
OS_VA1: 20/20
OS_VA1: 20/20

## 2025-05-24 ASSESSMENT — TONOMETRY
OS_IOP_MMHG: 12
OD_IOP_MMHG: 15

## 2025-05-24 ASSESSMENT — SUPERFICIAL PUNCTATE KERATITIS (SPK)
OD_SPK: 1+
OS_SPK: 1+

## 2025-05-24 ASSESSMENT — REFRACTION_AUTOREFRACTION
OD_AXIS: 146
OS_CYLINDER: -0.75
OS_SPHERE: -0.25
OD_SPHERE: -0.75
OD_CYLINDER: -0.25
OS_AXIS: 109

## 2025-05-24 ASSESSMENT — KERATOMETRY
OS_K1POWER_DIOPTERS: 46.25
OD_K2POWER_DIOPTERS: 46.75
OD_AXISANGLE_DEGREES: 050
OS_AXISANGLE_DEGREES: 035
OS_K2POWER_DIOPTERS: 47.00
OD_K1POWER_DIOPTERS: 46.25

## 2025-05-24 ASSESSMENT — VISUAL ACUITY
OD_BCVA: 20/20
OS_BCVA: 20/20

## 2025-05-24 ASSESSMENT — CONFRONTATIONAL VISUAL FIELD TEST (CVF)
OD_FINDINGS: FULL
OS_FINDINGS: FULL

## 2025-05-24 ASSESSMENT — LID EXAM ASSESSMENTS
OS_BLEPHARITIS: T
OD_BLEPHARITIS: T